# Patient Record
Sex: MALE | Race: WHITE | URBAN - METROPOLITAN AREA
[De-identification: names, ages, dates, MRNs, and addresses within clinical notes are randomized per-mention and may not be internally consistent; named-entity substitution may affect disease eponyms.]

---

## 2021-09-20 ENCOUNTER — HISTORICAL (OUTPATIENT)
Dept: ADMINISTRATIVE | Facility: HOSPITAL | Age: 41
End: 2021-09-20

## 2021-09-20 LAB — SARS-COV-2 RNA RESP QL NAA+PROBE: NOT DETECTED

## 2022-04-10 ENCOUNTER — HISTORICAL (OUTPATIENT)
Dept: ADMINISTRATIVE | Facility: HOSPITAL | Age: 42
End: 2022-04-10

## 2022-04-25 VITALS
HEIGHT: 72 IN | DIASTOLIC BLOOD PRESSURE: 91 MMHG | WEIGHT: 264.75 LBS | OXYGEN SATURATION: 99 % | BODY MASS INDEX: 35.86 KG/M2 | SYSTOLIC BLOOD PRESSURE: 133 MMHG

## 2024-10-10 ENCOUNTER — OFFICE VISIT (OUTPATIENT)
Dept: FAMILY MEDICINE | Facility: CLINIC | Age: 44
End: 2024-10-10

## 2024-10-10 VITALS
HEIGHT: 72 IN | DIASTOLIC BLOOD PRESSURE: 76 MMHG | HEART RATE: 75 BPM | RESPIRATION RATE: 18 BRPM | BODY MASS INDEX: 36.62 KG/M2 | WEIGHT: 270.38 LBS | TEMPERATURE: 98 F | SYSTOLIC BLOOD PRESSURE: 119 MMHG

## 2024-10-10 DIAGNOSIS — G47.00 INSOMNIA, UNSPECIFIED TYPE: ICD-10-CM

## 2024-10-10 DIAGNOSIS — R53.83 FATIGUE, UNSPECIFIED TYPE: ICD-10-CM

## 2024-10-10 DIAGNOSIS — Z00.00 WELL ADULT EXAM: Primary | ICD-10-CM

## 2024-10-10 PROCEDURE — 99386 PREV VISIT NEW AGE 40-64: CPT | Mod: S$PBB,,, | Performed by: NURSE PRACTITIONER

## 2024-10-10 PROCEDURE — 99213 OFFICE O/P EST LOW 20 MIN: CPT | Mod: PBBFAC,PN | Performed by: NURSE PRACTITIONER

## 2024-10-10 RX ORDER — TRAZODONE HYDROCHLORIDE 50 MG/1
50 TABLET ORAL NIGHTLY
Qty: 90 TABLET | Refills: 1 | Status: SHIPPED | OUTPATIENT
Start: 2024-10-10 | End: 2025-04-08

## 2024-10-10 NOTE — PROGRESS NOTES
[facility]  Rasheed Najera DNP     Patient ID: [id number]     Chief Complaint: Establish Care (Not fasting, refused vaccines)      HPI:     Feng Beth is a 43 y.o. male here today for establish with PCP. Would like a well visit, states he is having trouble sleeping at night and would like to discuss medical therapy.  He is requesting to have his testosterone checked.    Health Maintenance         Date Due Completion Date    Hepatitis C Screening Never done ---    Lipid Panel Never done ---    HIV Screening Never done ---    Hemoglobin A1c (Diabetic Prevention Screening) Never done ---    COVID-19 Vaccine (3 - 2024-25 season) 2024 10/7/2021    Influenza Vaccine (1) 2025 (Originally 2024) ---    TETANUS VACCINE 10/10/2025 (Originally 2007) 1997    RSV Vaccine (Age 60+ and Pregnant patients) (1 - 1-dose 75+ series) 10/12/2055 ---            History reviewed. No pertinent past medical history.     Past Surgical History:   Procedure Laterality Date    CHOLECYSTECTOMY      age 21    MOUTH SURGERY  2006        Social History     Tobacco Use    Smoking status: Former     Current packs/day: 0.00     Types: Cigarettes     Quit date: 10/10/2020     Years since quittin.0     Passive exposure: Past    Smokeless tobacco: Never    Tobacco comments:     Nicotine gum   Substance and Sexual Activity    Alcohol use: Yes     Alcohol/week: 6.0 standard drinks of alcohol     Types: 6 Cans of beer per week    Drug use: Not Currently     Types: Marijuana     Comment: flower and edible    Sexual activity: Not on file        Current Outpatient Medications   Medication Instructions    traZODone (DESYREL) 50 mg, Oral, Nightly       Review of patient's allergies indicates:  No Known Allergies     Patient Care Team:  Rasheed Najera FNP as PCP - General (Family Medicine)     Subjective:     Review of Systems   Constitutional:  Negative for activity change, appetite change, chills,  diaphoresis, fever and unexpected weight change.   HENT:  Negative for ear pain, hearing loss, rhinorrhea, sinus pain, sore throat and trouble swallowing.    Eyes:  Negative for pain, discharge and visual disturbance.   Respiratory:  Negative for cough, chest tightness, shortness of breath and wheezing.    Cardiovascular:  Negative for chest pain, palpitations and leg swelling.   Gastrointestinal:  Negative for abdominal pain, blood in stool, constipation, diarrhea, nausea and vomiting.   Endocrine: Negative for cold intolerance, polydipsia and polyuria.   Genitourinary:  Negative for difficulty urinating, dysuria, frequency, hematuria and urgency.   Musculoskeletal:  Negative for arthralgias, joint swelling, myalgias and neck pain.   Skin:  Negative for color change and rash.   Allergic/Immunologic: Negative.    Neurological:  Negative for dizziness, syncope, weakness, light-headedness, numbness and headaches.   Hematological: Negative.    Psychiatric/Behavioral:  Positive for sleep disturbance. Negative for confusion, dysphoric mood and suicidal ideas. The patient is not nervous/anxious.    All other systems reviewed and are negative.      12 point review of systems conducted, negative except as stated in the history of present illness. See HPI for details.    Objective:     Visit Vitals  /76   Pulse 75   Temp 98.1 °F (36.7 °C) (Oral)   Resp 18   Ht 6' (1.829 m)   Wt 122.7 kg (270 lb 6.4 oz)   BMI 36.67 kg/m²       Physical Exam  Vitals and nursing note reviewed.   Constitutional:       General: He is not in acute distress.     Appearance: He is not ill-appearing.   HENT:      Head: Normocephalic and atraumatic.      Mouth/Throat:      Mouth: Mucous membranes are moist.      Pharynx: Oropharynx is clear.   Eyes:      General: No scleral icterus.     Extraocular Movements: Extraocular movements intact.      Conjunctiva/sclera: Conjunctivae normal.      Pupils: Pupils are equal, round, and reactive to light.  "  Neck:      Vascular: No carotid bruit.   Cardiovascular:      Rate and Rhythm: Normal rate and regular rhythm.      Heart sounds: No murmur heard.     No friction rub. No gallop.   Pulmonary:      Effort: Pulmonary effort is normal. No respiratory distress.      Breath sounds: Normal breath sounds. No wheezing, rhonchi or rales.   Abdominal:      General: Abdomen is flat. Bowel sounds are normal. There is no distension.      Palpations: Abdomen is soft. There is no mass.      Tenderness: There is no abdominal tenderness.   Musculoskeletal:         General: Normal range of motion.      Cervical back: Normal range of motion and neck supple.   Skin:     General: Skin is warm and dry.   Neurological:      General: No focal deficit present.      Mental Status: He is alert.   Psychiatric:         Mood and Affect: Mood normal.         Labs Reviewed:     Chemistry:  Lab Results   Component Value Date     01/19/2022    K 4.2 01/19/2022    BUN 10.7 01/19/2022    CREATININE 0.93 01/19/2022    GLUCOSE 114 (H) 01/19/2022    CALCIUM 9.1 01/19/2022    ALKPHOS 94 01/19/2022    LABPROT 7.3 01/19/2022    ALBUMIN 3.7 01/19/2022    BILIDIR 0.2 01/19/2022    IBILI 0.30 01/19/2022    AST 28 01/19/2022    ALT 67 (H) 01/19/2022        No results found for: "HGBA1C", "MICROALBCREA"     Hematology:  Lab Results   Component Value Date    WBC 7.0 01/19/2022    HGB 14.3 01/19/2022    HCT 41.4 01/19/2022     01/19/2022       Lipid Panel:  No results found for: "CHOL", "HDL", "LDL", "TRIG", "TOTALCHOLEST"     Urine:  No results found for: "COLORUA", "APPEARANCEUA", "SGUA", "PHUA", "PROTEINUA", "GLUCOSEUA", "KETONESUA", "BLOODUA", "NITRITESUA", "LEUKOCYTESUR", "RBCUA", "WBCUA", "BACTERIA", "SQEPUA", "HYALINECASTS", "CREATRANDUR", "PROTEINURINE", "UPROTCREA"     Assessment:       ICD-10-CM ICD-9-CM   1. Well adult exam  Z00.00 V70.0   2. Insomnia, unspecified type  G47.00 780.52   3. Fatigue, unspecified type  R53.83 780.79    "     Plan:     1. Well adult exam  Assessment & Plan:  Counseled on importance of routine annual wellness exam and preventative health screenings at appropriate age intervals.    Counseled for healthy weight maintenance, active lifestyle, low carb, low-fat, low-cholesterol well-balanced diet as well as maintaining 20-30 minutes of aerobic activity daily.    Discussed importance of getting plenty of sleep nightly, drinking plenty of water daily, stress reduction, and maintenance of healthy social activities.     Orders:  -     CBC Auto Differential; Future; Expected date: 10/10/2024  -     Comprehensive Metabolic Panel; Future; Expected date: 10/10/2024  -     Lipid Panel; Future; Expected date: 10/10/2024  -     TSH; Future; Expected date: 10/10/2024  -     Hemoglobin A1C; Future; Expected date: 10/10/2024  -     Urinalysis; Future; Expected date: 10/10/2024  -     T4, Free; Future; Expected date: 10/10/2024    2. Insomnia, unspecified type  Assessment & Plan:  He reports he has tried many over-the-counter supplements including melatonin, magnesium, and has taken Unisom occasionally to help him sleep.  He reports history of ADHD as a teenager but he no longer takes medication and does not want to.  He reports he has tried decreasing caffeine, decreasing screen usage, reading a book, and other relaxation techniques and he still has trouble falling asleep and staying asleep.  He denies any snoring, no witnessed apnea, he does have a partner that degrees he has had no apnea symptoms.    We will have a trial of trazodone 50 mg once daily.  Discussed potential side effects, precautions, therapeutic goals and to notify if any adverse effects occur.    Orders:  -     traZODone (DESYREL) 50 MG tablet; Take 1 tablet (50 mg total) by mouth every evening.  Dispense: 90 tablet; Refill: 1    3. Fatigue, unspecified type  Assessment & Plan:  He denies fever, denies weight loss, denies night sweats, denies daily fatigue but is  requesting to have his testosterone checked.    Orders:  -     Testosterone; Future; Expected date: 10/10/2024         Follow up in about 1 month (around 11/10/2024) for follow up, with lab work prior to visit. In addition to their scheduled follow up, the patient has also been instructed to follow up on as needed basis.     Future Appointments   Date Time Provider Department Center   11/12/2024  9:40 AM Rasheed Najera FNP [facility]        [facility]

## 2024-10-11 PROBLEM — R53.83 FATIGUE: Status: ACTIVE | Noted: 2024-10-11

## 2024-10-11 PROBLEM — G47.00 INSOMNIA: Status: ACTIVE | Noted: 2024-10-11

## 2024-10-11 PROBLEM — Z00.00 WELL ADULT EXAM: Status: ACTIVE | Noted: 2024-10-11

## 2024-10-11 NOTE — ASSESSMENT & PLAN NOTE
He denies fever, denies weight loss, denies night sweats, denies daily fatigue but is requesting to have his testosterone checked.

## 2024-10-11 NOTE — ASSESSMENT & PLAN NOTE
Counseled on importance of routine annual wellness exam and preventative health screenings at appropriate age intervals.    Counseled for healthy weight maintenance, active lifestyle, low carb, low-fat, low-cholesterol well-balanced diet as well as maintaining 20-30 minutes of aerobic activity daily.    Discussed importance of getting plenty of sleep nightly, drinking plenty of water daily, stress reduction, and maintenance of healthy social activities.

## 2024-10-11 NOTE — ASSESSMENT & PLAN NOTE
He reports he has tried many over-the-counter supplements including melatonin, magnesium, and has taken Unisom occasionally to help him sleep.  He reports history of ADHD as a teenager but he no longer takes medication and does not want to.  He reports he has tried decreasing caffeine, decreasing screen usage, reading a book, and other relaxation techniques and he still has trouble falling asleep and staying asleep.  He denies any snoring, no witnessed apnea, he does have a partner that degrees he has had no apnea symptoms.    We will have a trial of trazodone 50 mg once daily.  Discussed potential side effects, precautions, therapeutic goals and to notify if any adverse effects occur.   Pt presents to triage with c/o body aches, chills, N/V, HA, and mild SOB. LCTA O2 sat 100%. Temp 99.5. States symptoms began around 1300 today. Call placed to Dr. Paulo Barton. Orders given.

## 2024-10-15 ENCOUNTER — CLINICAL SUPPORT (OUTPATIENT)
Dept: FAMILY MEDICINE | Facility: CLINIC | Age: 44
End: 2024-10-15

## 2024-10-15 DIAGNOSIS — R53.83 FATIGUE, UNSPECIFIED TYPE: ICD-10-CM

## 2024-10-15 DIAGNOSIS — Z00.00 WELL ADULT EXAM: ICD-10-CM

## 2024-10-15 LAB
ALBUMIN SERPL-MCNC: 3.7 G/DL (ref 3.5–5)
ALBUMIN/GLOB SERPL: 1.1 RATIO (ref 1.1–2)
ALP SERPL-CCNC: 59 UNIT/L (ref 40–150)
ALT SERPL-CCNC: 16 UNIT/L (ref 0–55)
ANION GAP SERPL CALC-SCNC: 8 MEQ/L
AST SERPL-CCNC: 13 UNIT/L (ref 5–34)
BASOPHILS # BLD AUTO: 0.06 X10(3)/MCL
BASOPHILS NFR BLD AUTO: 1.1 %
BILIRUB SERPL-MCNC: 0.5 MG/DL
BUN SERPL-MCNC: 15.4 MG/DL (ref 8.9–20.6)
CALCIUM SERPL-MCNC: 9.6 MG/DL (ref 8.4–10.2)
CHLORIDE SERPL-SCNC: 104 MMOL/L (ref 98–107)
CHOLEST SERPL-MCNC: 199 MG/DL
CHOLEST/HDLC SERPL: 5 {RATIO} (ref 0–5)
CO2 SERPL-SCNC: 28 MMOL/L (ref 22–29)
CREAT SERPL-MCNC: 1.06 MG/DL (ref 0.73–1.18)
CREAT/UREA NIT SERPL: 15
EOSINOPHIL # BLD AUTO: 0.1 X10(3)/MCL (ref 0–0.9)
EOSINOPHIL NFR BLD AUTO: 1.8 %
ERYTHROCYTE [DISTWIDTH] IN BLOOD BY AUTOMATED COUNT: 12.2 % (ref 11.5–17)
EST. AVERAGE GLUCOSE BLD GHB EST-MCNC: 96.8 MG/DL
GFR SERPLBLD CREATININE-BSD FMLA CKD-EPI: >60 ML/MIN/1.73/M2
GLOBULIN SER-MCNC: 3.4 GM/DL (ref 2.4–3.5)
GLUCOSE SERPL-MCNC: 94 MG/DL (ref 74–100)
HBA1C MFR BLD: 5 %
HCT VFR BLD AUTO: 46.2 % (ref 42–52)
HDLC SERPL-MCNC: 40 MG/DL (ref 35–60)
HGB BLD-MCNC: 15.7 G/DL (ref 14–18)
IMM GRANULOCYTES # BLD AUTO: 0.01 X10(3)/MCL (ref 0–0.04)
IMM GRANULOCYTES NFR BLD AUTO: 0.2 %
LDLC SERPL CALC-MCNC: 117 MG/DL (ref 50–140)
LYMPHOCYTES # BLD AUTO: 1.8 X10(3)/MCL (ref 0.6–4.6)
LYMPHOCYTES NFR BLD AUTO: 31.9 %
MCH RBC QN AUTO: 31.4 PG (ref 27–31)
MCHC RBC AUTO-ENTMCNC: 34 G/DL (ref 33–36)
MCV RBC AUTO: 92.4 FL (ref 80–94)
MONOCYTES # BLD AUTO: 0.69 X10(3)/MCL (ref 0.1–1.3)
MONOCYTES NFR BLD AUTO: 12.2 %
NEUTROPHILS # BLD AUTO: 2.99 X10(3)/MCL (ref 2.1–9.2)
NEUTROPHILS NFR BLD AUTO: 52.8 %
NRBC BLD AUTO-RTO: 0 %
PLATELET # BLD AUTO: 267 X10(3)/MCL (ref 130–400)
PMV BLD AUTO: 9.9 FL (ref 7.4–10.4)
POTASSIUM SERPL-SCNC: 4.6 MMOL/L (ref 3.5–5.1)
PROT SERPL-MCNC: 7.1 GM/DL (ref 6.4–8.3)
RBC # BLD AUTO: 5 X10(6)/MCL (ref 4.7–6.1)
SODIUM SERPL-SCNC: 140 MMOL/L (ref 136–145)
T4 FREE SERPL-MCNC: 1 NG/DL (ref 0.7–1.48)
TESTOST SERPL-MCNC: 348.76 NG/DL (ref 240.24–870.68)
TRIGL SERPL-MCNC: 212 MG/DL (ref 34–140)
TSH SERPL-ACNC: 1.26 UIU/ML (ref 0.35–4.94)
VLDLC SERPL CALC-MCNC: 42 MG/DL
WBC # BLD AUTO: 5.65 X10(3)/MCL (ref 4.5–11.5)

## 2024-10-15 PROCEDURE — 84439 ASSAY OF FREE THYROXINE: CPT

## 2024-10-15 PROCEDURE — 84403 ASSAY OF TOTAL TESTOSTERONE: CPT

## 2024-10-15 PROCEDURE — 99211 OFF/OP EST MAY X REQ PHY/QHP: CPT | Mod: PBBFAC,PN

## 2024-10-15 PROCEDURE — 80053 COMPREHEN METABOLIC PANEL: CPT

## 2024-10-15 PROCEDURE — 84443 ASSAY THYROID STIM HORMONE: CPT

## 2024-10-15 PROCEDURE — 80061 LIPID PANEL: CPT

## 2024-10-15 PROCEDURE — 83036 HEMOGLOBIN GLYCOSYLATED A1C: CPT

## 2024-10-15 PROCEDURE — 36415 COLL VENOUS BLD VENIPUNCTURE: CPT

## 2024-10-15 PROCEDURE — 85025 COMPLETE CBC W/AUTO DIFF WBC: CPT

## 2025-07-30 ENCOUNTER — TELEPHONE (OUTPATIENT)
Dept: FAMILY MEDICINE | Facility: CLINIC | Age: 45
End: 2025-07-30

## 2025-07-30 NOTE — TELEPHONE ENCOUNTER
?? **Pre-Visit Call Screening**       ?? Reason for Visit  Chief complaint:  General checkup, Requesting labwork         ?? Recent Health Events  - Any ER, urgent care, or hospital visits since last appointment: No    - If yes, date & location:       ?? Access and Support Needs  - Transportation issues: No  -  needed:  No

## 2025-08-08 ENCOUNTER — TELEPHONE (OUTPATIENT)
Dept: FAMILY MEDICINE | Facility: CLINIC | Age: 45
End: 2025-08-08

## 2025-08-08 NOTE — TELEPHONE ENCOUNTER
?? **Pre-Visit Call Screening**       ?? Reason for Visit  Chief complaint:  General checkup, Blood work          ?? Recent Health Events  - Any ER, urgent care, or hospital visits since last appointment: No    - If yes, date & location:       ?? Access and Support Needs  - Transportation issues: No  -  needed:  No

## 2025-08-13 ENCOUNTER — OFFICE VISIT (OUTPATIENT)
Dept: FAMILY MEDICINE | Facility: CLINIC | Age: 45
End: 2025-08-13

## 2025-08-13 VITALS
OXYGEN SATURATION: 100 % | HEART RATE: 74 BPM | SYSTOLIC BLOOD PRESSURE: 134 MMHG | WEIGHT: 267.13 LBS | DIASTOLIC BLOOD PRESSURE: 88 MMHG | TEMPERATURE: 98 F | BODY MASS INDEX: 36.23 KG/M2

## 2025-08-13 DIAGNOSIS — Z00.00 WELLNESS EXAMINATION: Primary | ICD-10-CM

## 2025-08-13 DIAGNOSIS — R53.83 OTHER FATIGUE: ICD-10-CM

## 2025-08-13 DIAGNOSIS — G25.81 RESTLESS LEGS: ICD-10-CM

## 2025-08-13 DIAGNOSIS — E66.01 SEVERE OBESITY: ICD-10-CM

## 2025-08-13 DIAGNOSIS — R06.83 SNORING: ICD-10-CM

## 2025-08-13 DIAGNOSIS — G47.00 INSOMNIA, UNSPECIFIED TYPE: ICD-10-CM

## 2025-08-13 PROCEDURE — 99213 OFFICE O/P EST LOW 20 MIN: CPT | Mod: PBBFAC,PN | Performed by: NURSE PRACTITIONER

## 2025-08-13 PROCEDURE — 99214 OFFICE O/P EST MOD 30 MIN: CPT | Mod: S$PBB,,, | Performed by: NURSE PRACTITIONER

## 2025-08-15 ENCOUNTER — TELEPHONE (OUTPATIENT)
Dept: RADIOLOGY | Facility: HOSPITAL | Age: 45
End: 2025-08-15